# Patient Record
Sex: MALE | Race: WHITE | HISPANIC OR LATINO | URBAN - METROPOLITAN AREA
[De-identification: names, ages, dates, MRNs, and addresses within clinical notes are randomized per-mention and may not be internally consistent; named-entity substitution may affect disease eponyms.]

---

## 2017-12-14 ENCOUNTER — IMPORTED ENCOUNTER (OUTPATIENT)
Dept: URBAN - METROPOLITAN AREA CLINIC 50 | Facility: CLINIC | Age: 72
End: 2017-12-14

## 2018-09-04 ENCOUNTER — IMPORTED ENCOUNTER (OUTPATIENT)
Dept: URBAN - METROPOLITAN AREA CLINIC 50 | Facility: CLINIC | Age: 73
End: 2018-09-04

## 2018-09-11 ENCOUNTER — IMPORTED ENCOUNTER (OUTPATIENT)
Dept: URBAN - METROPOLITAN AREA CLINIC 50 | Facility: CLINIC | Age: 73
End: 2018-09-11

## 2018-12-14 ENCOUNTER — IMPORTED ENCOUNTER (OUTPATIENT)
Dept: URBAN - METROPOLITAN AREA CLINIC 50 | Facility: CLINIC | Age: 73
End: 2018-12-14

## 2019-01-17 ENCOUNTER — IMPORTED ENCOUNTER (OUTPATIENT)
Dept: URBAN - METROPOLITAN AREA CLINIC 50 | Facility: CLINIC | Age: 74
End: 2019-01-17

## 2019-01-17 NOTE — PATIENT DISCUSSION
"""Discussed with patient current allergy syptoms. Start PF Artificial tears four times a day.  Start ""

## 2019-02-01 ENCOUNTER — IMPORTED ENCOUNTER (OUTPATIENT)
Dept: URBAN - METROPOLITAN AREA CLINIC 50 | Facility: CLINIC | Age: 74
End: 2019-02-01

## 2019-11-21 ENCOUNTER — IMPORTED ENCOUNTER (OUTPATIENT)
Dept: URBAN - METROPOLITAN AREA CLINIC 50 | Facility: CLINIC | Age: 74
End: 2019-11-21

## 2019-11-22 ENCOUNTER — IMPORTED ENCOUNTER (OUTPATIENT)
Dept: URBAN - METROPOLITAN AREA CLINIC 50 | Facility: CLINIC | Age: 74
End: 2019-11-22

## 2020-05-28 ENCOUNTER — IMPORTED ENCOUNTER (OUTPATIENT)
Dept: URBAN - METROPOLITAN AREA CLINIC 50 | Facility: CLINIC | Age: 75
End: 2020-05-28

## 2020-09-30 NOTE — PATIENT DISCUSSION
New Prescription: Maxitrol (neomycin-polymyxin-dexameth): drops,suspension: 3.5-10,000-0.1 mg/mL-unit/mL-% 1 drop three times a day as directed into left eye 09-

## 2020-10-20 NOTE — PATIENT DISCUSSION
Continue: Maxitrol (neomycin-polymyxin-dexameth): drops,suspension: 3.5-10,000-0.1 mg/mL-unit/mL-% 1 drop three times a day as directed into left eye 09-

## 2021-04-17 ASSESSMENT — VISUAL ACUITY
OD_SC: 20/20
OS_SC: 20/20-
OD_BAT: 20/25
OS_SC: 20/25
OD_SC: 20/25
OS_OTHER: 20/25. 20/40.
OD_SC: 20/25+
OD_SC: 20/20-
OD_SC: 20/20
OS_SC: 20/20-1
OS_SC: 20/25
OS_SC: 20/20-2
OS_BAT: 20/25
OS_SC: 20/25
OD_CC: J1
OS_SC: 20/25+2
OD_SC: 20/20-1
OS_CC: J1
OD_SC: 20/25-
OD_OTHER: 20/25. 20/30.

## 2021-04-17 ASSESSMENT — TONOMETRY
OD_IOP_MMHG: 16
OS_IOP_MMHG: 16
OD_IOP_MMHG: 16
OS_IOP_MMHG: 18
OS_IOP_MMHG: 16
OS_IOP_MMHG: 16
OS_IOP_MMHG: 15
OD_IOP_MMHG: 17
OD_IOP_MMHG: 17
OS_IOP_MMHG: 16
OD_IOP_MMHG: 13
OD_IOP_MMHG: 16

## 2021-06-03 ENCOUNTER — COMPREHENSIVE EXAM (OUTPATIENT)
Dept: URBAN - METROPOLITAN AREA CLINIC 49 | Facility: CLINIC | Age: 76
End: 2021-06-03

## 2021-06-03 DIAGNOSIS — H35.3130: ICD-10-CM

## 2021-06-03 DIAGNOSIS — H26.493: ICD-10-CM

## 2021-06-03 DIAGNOSIS — H43.813: ICD-10-CM

## 2021-06-03 DIAGNOSIS — H35.372: ICD-10-CM

## 2021-06-03 PROCEDURE — 92014 COMPRE OPH EXAM EST PT 1/>: CPT

## 2021-06-03 PROCEDURE — 92015 DETERMINE REFRACTIVE STATE: CPT

## 2021-06-03 ASSESSMENT — VISUAL ACUITY
OS_GLARE: 20/40
OD_SC: 20/20-2
OS_SC: 20/30-1
OD_GLARE: 20/25
OS_GLARE: 20/30
OD_GLARE: 20/25
OU_SC: J3
OS_PH: 20/25-1
OU_SC: 20/20-2

## 2021-06-03 ASSESSMENT — TONOMETRY
OD_IOP_MMHG: 16
OS_IOP_MMHG: 17

## 2021-06-03 NOTE — PATIENT DISCUSSION
Refer to [facility] to rule out malignancy. (Patient has appt. scheduled for 6-11-21 with Dr. Ryne Mendoza. ).

## 2021-06-03 NOTE — PATIENT DISCUSSION
Patient states a small black lesion was being followed with the [address] Today presents with a sacculated white lesion.

## 2022-07-07 ENCOUNTER — COMPREHENSIVE EXAM (OUTPATIENT)
Dept: URBAN - METROPOLITAN AREA CLINIC 49 | Facility: CLINIC | Age: 77
End: 2022-07-07

## 2022-07-07 DIAGNOSIS — H11.231: ICD-10-CM

## 2022-07-07 DIAGNOSIS — H43.813: ICD-10-CM

## 2022-07-07 DIAGNOSIS — H04.123: ICD-10-CM

## 2022-07-07 DIAGNOSIS — H35.372: ICD-10-CM

## 2022-07-07 DIAGNOSIS — H26.493: ICD-10-CM

## 2022-07-07 DIAGNOSIS — H35.3130: ICD-10-CM

## 2022-07-07 PROCEDURE — 92134 CPTRZ OPH DX IMG PST SGM RTA: CPT

## 2022-07-07 PROCEDURE — 92012 INTRM OPH EXAM EST PATIENT: CPT

## 2022-07-07 ASSESSMENT — TONOMETRY
OS_IOP_MMHG: 14
OD_IOP_MMHG: 15

## 2022-07-07 ASSESSMENT — VISUAL ACUITY
OU_CC: J1+ @ 18 INCHES
OD_CC: 20/25
OS_CC: 20/20-1
OS_GLARE: 20/30
OD_GLARE: 20/50
OD_GLARE: 20/40
OS_GLARE: 20/60

## 2023-01-17 ENCOUNTER — FOLLOW UP (OUTPATIENT)
Dept: URBAN - METROPOLITAN AREA CLINIC 53 | Facility: CLINIC | Age: 78
End: 2023-01-17

## 2023-01-17 DIAGNOSIS — H04.123: ICD-10-CM

## 2023-01-17 PROCEDURE — 92012 INTRM OPH EXAM EST PATIENT: CPT

## 2023-01-17 RX ORDER — OLOPATADINE HYDROCHLORIDE 2 MG/ML: 1 SOLUTION OPHTHALMIC EVERY MORNING

## 2023-01-17 RX ORDER — ALCAFTADINE 2.5 MG/ML: 1 SOLUTION/ DROPS OPHTHALMIC EVERY EVENING

## 2023-01-17 ASSESSMENT — VISUAL ACUITY
OS_CC: 20/20-2
OD_CC: 20/25+2

## 2023-01-17 ASSESSMENT — TONOMETRY
OD_IOP_MMHG: 14
OS_IOP_MMHG: 15

## 2023-01-17 NOTE — PATIENT DISCUSSION
Patient is having a reaction to the gtt. Patient to start using Pataday (green) 1 gtt Qam and Lastacaft 1 gtt Qpm. Patient to discontinue Preds Acetate and Lotemax. RTC as scheduled.

## 2023-07-18 ENCOUNTER — COMPREHENSIVE EXAM (OUTPATIENT)
Dept: URBAN - METROPOLITAN AREA CLINIC 53 | Facility: CLINIC | Age: 78
End: 2023-07-18

## 2023-07-18 DIAGNOSIS — H43.813: ICD-10-CM

## 2023-07-18 DIAGNOSIS — H35.3131: ICD-10-CM

## 2023-07-18 DIAGNOSIS — H26.493: ICD-10-CM

## 2023-07-18 DIAGNOSIS — H35.372: ICD-10-CM

## 2023-07-18 PROCEDURE — 92014 COMPRE OPH EXAM EST PT 1/>: CPT

## 2023-07-18 PROCEDURE — 92015 DETERMINE REFRACTIVE STATE: CPT

## 2023-07-18 ASSESSMENT — VISUAL ACUITY
OU_SC: J3@16"
OD_GLARE: 20/20-2
OD_SC: 20/30
OS_GLARE: 20/20
OS_SC: 20/30
OS_PH: 20/20-1
OS_GLARE: 20/20
OD_GLARE: 20/20
OD_PH: 20/20-1

## 2023-07-18 ASSESSMENT — TONOMETRY
OS_IOP_MMHG: 15
OD_IOP_MMHG: 15

## 2024-07-23 ENCOUNTER — COMPREHENSIVE EXAM (OUTPATIENT)
Dept: URBAN - METROPOLITAN AREA CLINIC 53 | Facility: CLINIC | Age: 79
End: 2024-07-23

## 2024-07-23 DIAGNOSIS — H04.123: ICD-10-CM

## 2024-07-23 DIAGNOSIS — H11.231: ICD-10-CM

## 2024-07-23 DIAGNOSIS — H35.372: ICD-10-CM

## 2024-07-23 DIAGNOSIS — H35.361: ICD-10-CM

## 2024-07-23 DIAGNOSIS — H52.4: ICD-10-CM

## 2024-07-23 PROCEDURE — 92015 DETERMINE REFRACTIVE STATE: CPT

## 2024-07-23 PROCEDURE — 99214 OFFICE O/P EST MOD 30 MIN: CPT

## 2024-07-23 PROCEDURE — 92134 CPTRZ OPH DX IMG PST SGM RTA: CPT

## 2024-07-23 ASSESSMENT — VISUAL ACUITY
OS_GLARE: 20/25
OS_CC: J1+/-
OD_CC: J1+ (-1)
OD_GLARE: 20/25
OS_CC: 20/25+/-
OD_CC: 20/20
OU_SC: 20/20-2
OU_CC: 20/20
OS_CC: 20/30-2
OU_CC: J1-2
OD_GLARE: 20/20
OU_SC: J2-2
OD_SC: 20/20-1
OS_GLARE: 20/25

## 2024-07-23 ASSESSMENT — TONOMETRY
OS_IOP_MMHG: 12
OD_IOP_MMHG: 13

## 2025-08-05 ENCOUNTER — COMPREHENSIVE EXAM (OUTPATIENT)
Age: 80
End: 2025-08-05

## 2025-08-05 DIAGNOSIS — H52.4: ICD-10-CM

## 2025-08-05 DIAGNOSIS — D18.09: ICD-10-CM

## 2025-08-05 DIAGNOSIS — H01.004: ICD-10-CM

## 2025-08-05 DIAGNOSIS — H04.123: ICD-10-CM

## 2025-08-05 DIAGNOSIS — H26.493: ICD-10-CM

## 2025-08-05 DIAGNOSIS — H43.813: ICD-10-CM

## 2025-08-05 DIAGNOSIS — H11.231: ICD-10-CM

## 2025-08-05 DIAGNOSIS — G70.00: ICD-10-CM

## 2025-08-05 DIAGNOSIS — H18.513: ICD-10-CM

## 2025-08-05 DIAGNOSIS — H01.001: ICD-10-CM

## 2025-08-05 PROCEDURE — 99214 OFFICE O/P EST MOD 30 MIN: CPT

## 2025-08-05 PROCEDURE — 92134 CPTRZ OPH DX IMG PST SGM RTA: CPT

## 2025-08-05 PROCEDURE — 92015 DETERMINE REFRACTIVE STATE: CPT

## 2025-08-05 RX ORDER — CARBOXYMETHYLCELLULOSE SODIUM 10 MG/ML
1 GEL OPHTHALMIC EVERY EVENING
Start: 2025-08-05